# Patient Record
Sex: FEMALE | Race: WHITE
[De-identification: names, ages, dates, MRNs, and addresses within clinical notes are randomized per-mention and may not be internally consistent; named-entity substitution may affect disease eponyms.]

---

## 2018-01-15 ENCOUNTER — HOSPITAL ENCOUNTER (EMERGENCY)
Dept: HOSPITAL 84 - D.ER | Age: 23
Discharge: HOME | End: 2018-01-15
Payer: MEDICAID

## 2018-01-15 VITALS — BODY MASS INDEX: 27.7 KG/M2
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
## 2018-01-15 DIAGNOSIS — S61.511A: Primary | ICD-10-CM
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
## 2018-01-15 DIAGNOSIS — F17.200: ICD-10-CM

## 2018-06-14 ENCOUNTER — HOSPITAL ENCOUNTER (EMERGENCY)
Dept: HOSPITAL 84 - D.ER | Age: 23
Discharge: HOME | End: 2018-06-14
Payer: MEDICAID

## 2018-06-14 VITALS — DIASTOLIC BLOOD PRESSURE: 57 MMHG | SYSTOLIC BLOOD PRESSURE: 109 MMHG

## 2018-06-14 VITALS — BODY MASS INDEX: 20.66 KG/M2 | WEIGHT: 105.22 LBS | HEIGHT: 60 IN
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
## 2018-06-14 DIAGNOSIS — F17.200: ICD-10-CM

## 2018-06-14 DIAGNOSIS — S60.222A: Primary | ICD-10-CM

## 2018-06-14 DIAGNOSIS — S61.213A: ICD-10-CM
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
## 2018-09-13 ENCOUNTER — HOSPITAL ENCOUNTER (EMERGENCY)
Dept: HOSPITAL 84 - D.ER | Age: 23
Discharge: HOME | End: 2018-09-13
Payer: MEDICAID

## 2018-09-13 VITALS — DIASTOLIC BLOOD PRESSURE: 74 MMHG | SYSTOLIC BLOOD PRESSURE: 121 MMHG

## 2018-09-13 VITALS
HEIGHT: 60 IN | BODY MASS INDEX: 19.63 KG/M2 | WEIGHT: 100 LBS | HEIGHT: 60 IN | WEIGHT: 100 LBS | BODY MASS INDEX: 19.63 KG/M2
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
## 2018-09-13 DIAGNOSIS — S42.021A: Primary | ICD-10-CM
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
## 2018-09-13 DIAGNOSIS — F17.200: ICD-10-CM

## 2018-10-08 ENCOUNTER — HOSPITAL ENCOUNTER (EMERGENCY)
Dept: HOSPITAL 84 - D.ER | Age: 23
LOS: 1 days | Discharge: HOME | End: 2018-10-09
Payer: MEDICAID

## 2018-10-08 VITALS — WEIGHT: 110.23 LBS | BODY MASS INDEX: 21.64 KG/M2 | HEIGHT: 60 IN

## 2018-10-08 DIAGNOSIS — S42.001A: Primary | ICD-10-CM
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
## 2018-10-08 DIAGNOSIS — F17.200: ICD-10-CM
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
## 2018-10-09 VITALS — SYSTOLIC BLOOD PRESSURE: 132 MMHG | DIASTOLIC BLOOD PRESSURE: 78 MMHG

## 2021-03-04 ENCOUNTER — HOSPITAL ENCOUNTER (EMERGENCY)
Dept: HOSPITAL 84 - D.ER | Age: 26
Discharge: HOME | End: 2021-03-04
Payer: MEDICAID

## 2021-03-04 VITALS
BODY MASS INDEX: 24 KG/M2 | HEIGHT: 60 IN | HEIGHT: 60 IN | WEIGHT: 122.26 LBS | BODY MASS INDEX: 24 KG/M2 | WEIGHT: 122.26 LBS

## 2021-03-04 VITALS — DIASTOLIC BLOOD PRESSURE: 70 MMHG | SYSTOLIC BLOOD PRESSURE: 112 MMHG

## 2021-03-04 DIAGNOSIS — Z72.0: ICD-10-CM

## 2021-03-04 DIAGNOSIS — J32.9: Primary | ICD-10-CM

## 2021-03-04 DIAGNOSIS — R04.2: ICD-10-CM

## 2021-03-05 ENCOUNTER — HOSPITAL ENCOUNTER (INPATIENT)
Dept: HOSPITAL 84 - D.ER | Age: 26
LOS: 3 days | Discharge: HOME | DRG: 176 | End: 2021-03-08
Attending: FAMILY MEDICINE | Admitting: FAMILY MEDICINE
Payer: MEDICAID

## 2021-03-05 VITALS
WEIGHT: 125.4 LBS | HEIGHT: 60 IN | HEIGHT: 60 IN | WEIGHT: 125.4 LBS | BODY MASS INDEX: 24.62 KG/M2 | BODY MASS INDEX: 24.62 KG/M2 | BODY MASS INDEX: 24.62 KG/M2

## 2021-03-05 VITALS — SYSTOLIC BLOOD PRESSURE: 98 MMHG | DIASTOLIC BLOOD PRESSURE: 45 MMHG

## 2021-03-05 VITALS — DIASTOLIC BLOOD PRESSURE: 54 MMHG | SYSTOLIC BLOOD PRESSURE: 112 MMHG

## 2021-03-05 VITALS — DIASTOLIC BLOOD PRESSURE: 73 MMHG | SYSTOLIC BLOOD PRESSURE: 120 MMHG

## 2021-03-05 VITALS — DIASTOLIC BLOOD PRESSURE: 56 MMHG | SYSTOLIC BLOOD PRESSURE: 98 MMHG

## 2021-03-05 VITALS — SYSTOLIC BLOOD PRESSURE: 111 MMHG | DIASTOLIC BLOOD PRESSURE: 70 MMHG

## 2021-03-05 DIAGNOSIS — F17.200: ICD-10-CM

## 2021-03-05 DIAGNOSIS — I26.99: Primary | ICD-10-CM

## 2021-03-05 DIAGNOSIS — J32.9: ICD-10-CM

## 2021-03-05 DIAGNOSIS — D64.9: ICD-10-CM

## 2021-03-05 DIAGNOSIS — F12.90: ICD-10-CM

## 2021-03-05 DIAGNOSIS — R09.1: ICD-10-CM

## 2021-03-05 DIAGNOSIS — R07.89: ICD-10-CM

## 2021-03-05 LAB
ALBUMIN SERPL-MCNC: 3.7 G/DL (ref 3.4–5)
ALP SERPL-CCNC: 81 U/L (ref 30–120)
ALT SERPL-CCNC: 34 U/L (ref 10–68)
AMPHETAMINES UR QL SCN: NEGATIVE QUAL
ANION GAP SERPL CALC-SCNC: 11.5 MMOL/L (ref 8–16)
BARBITURATES UR QL SCN: NEGATIVE QUAL
BASOPHILS NFR BLD AUTO: 0.1 % (ref 0–2)
BENZODIAZ UR QL SCN: NEGATIVE QUAL
BILIRUB SERPL-MCNC: 0.46 MG/DL (ref 0.2–1.3)
BILIRUB SERPL-MCNC: NEGATIVE MG/DL
BUN SERPL-MCNC: 12 MG/DL (ref 7–18)
BZE UR QL SCN: NEGATIVE QUAL
CALCIUM SERPL-MCNC: 8.9 MG/DL (ref 8.5–10.1)
CANNABINOIDS UR QL SCN: POSITIVE QUAL
CHLORIDE SERPL-SCNC: 100 MMOL/L (ref 98–107)
CK MB SERPL-MCNC: 0.1 U/L (ref 0–3.6)
CK SERPL-CCNC: 353 UL (ref 21–215)
CO2 SERPL-SCNC: 27.1 MMOL/L (ref 21–32)
CREAT SERPL-MCNC: 0.7 MG/DL (ref 0.6–1.3)
EOSINOPHIL NFR BLD: 3.3 % (ref 0–7)
ERYTHROCYTE [DISTWIDTH] IN BLOOD BY AUTOMATED COUNT: 13.1 % (ref 11.5–14.5)
GLOBULIN SER-MCNC: 3.8 G/L
GLUCOSE SERPL-MCNC: 109 MG/DL (ref 74–106)
HCG SERPL-ACNC: NEGATIVE M[IU]/ML
HCT VFR BLD CALC: 41.6 % (ref 36–48)
HGB BLD-MCNC: 14.3 G/DL (ref 12–16)
IMM GRANULOCYTES NFR BLD: 0.2 % (ref 0–5)
KETONES UR STRIP-MCNC: (no result) MG/DL
LYMPHOCYTES # BLD: 2.09 10X3/UL (ref 1.18–3.74)
LYMPHOCYTES NFR BLD AUTO: 18 % (ref 15–50)
MCH RBC QN AUTO: 29.9 PG (ref 26–34)
MCHC RBC AUTO-ENTMCNC: 34.4 G/DL (ref 31–37)
MCV RBC: 87 FL (ref 80–100)
MONOCYTES NFR BLD: 9.9 % (ref 2–11)
NEUTROPHILS # BLD: 7.94 10X3/UL (ref 1.56–6.13)
NEUTROPHILS NFR BLD AUTO: 68.5 % (ref 40–80)
NITRITE UR-MCNC: NEGATIVE MG/ML
OPIATES UR QL SCN: NEGATIVE QUAL
OSMOLALITY SERPL CALC.SUM OF ELEC: 270 MOSM/KG (ref 275–300)
PCP UR QL SCN: NEGATIVE QUAL
PH UR STRIP: 6 [PH] (ref 5–8)
PLATELET # BLD: 207 10X3/UL (ref 130–400)
PMV BLD AUTO: 9.2 FL (ref 7.4–10.4)
POTASSIUM SERPL-SCNC: 3.6 MMOL/L (ref 3.5–5.1)
PROT SERPL-MCNC: 7.5 G/DL (ref 6.4–8.2)
RBC # BLD AUTO: 4.78 10X6/UL (ref 4–5.4)
SODIUM SERPL-SCNC: 135 MMOL/L (ref 136–145)
UROBILINOGEN UR-MCNC: 1 MG/DL (ref ?–2)
WBC # BLD AUTO: 11.6 10X3/UL (ref 4.8–10.8)

## 2021-03-05 NOTE — NUR
PATIENT IS SCREAMING AND CRYING IN WAITING ROOM AND TRIAGE, STAFF ATTEMPTED TO
CALM HER, PATIENT UNCOOPERATIVE. TRIAGE NURSE ATTEMPTING TO ASSESS. CARDIAC AND
RESPIRATORY WNL.

## 2021-03-05 NOTE — EC
PATIENT:SABIHA ERAZO                 DATE OF SERVICE: 03/05/21
SEX: F                                  MEDICAL RECORD: [id number]
DATE OF BIRTH: [birth year]                        LOCATION:[facility]
AGE OF PATIENT: 25                             ADMISSION DATE: 03/05/21
 
REFERRING PHYSICIAN:                               
 
INTERPRETING PHYSICIAN: CODY VEE MD               
 
 
 
                             ECHOCARDIOGRAM REPORT
  ECHO CHARGES 4               ECHO COMPLETE                 Date: 03/07/21
 
 
 
CLINICAL DIAGNOSIS: BILATERAL PE'S, CHEST PAIN    
 
                         ECHOCARDIOGRAPHIC MEASUREMENTS
      (adult normal given)
   AC root (d.<3.7cm) 2.5  cm   LV Septum d (<1.2 cm> 0.8  cm
      Valve Excursion 1.2  cm     LV Septum (systole) 0.9  cm
Left Atria (s.<4.0cm> 2.9  cm          LVPW d(<1.2cm) 0.9  cm
        RV (d.<2.3cm) 2.7  cm           LVPW (sytole) 1.4  cm
  LV diastole(<5.6CM) 4.0  cm       MV E-F(>70mm/sec)      cm
           LV systole 2.7  cm           LVOT Diameter 1.9  cm
       MV exc.(>10mm) 1.6  cm
Est.ejection fraction (50-75%)     %
 
   DOPPLER:
     LVIT      cm/sec A 67.0 cm/sec E 111.0 cm/sec
       LA      cm/sec      RVSP 37   mmHg
     LVOT 116  cm/sec   AOP1/2T      m/s
  Asc. Ao 137  cm/sec
     RVOT 55   cm/sec
       RA      cm/sec
         cm/sec
 AV Gradient Peak 7.51 mmHg  AV Mean 3.73 mmHg  AV Area 2.8  cm
 MV Gradient Peak 6.63 mmHg  MV Mean 2.95 mmHg  MV Area      cm
   COMMENTS:                                              
 
 
 Cardiac Sonographer: 2               CLAUDIO KINNEY              
      Cardiologist: 5          Dr. Vee               
             TAPE# PACS           
                                       Pericardial Effusion N                        
 
 
DATE OF SERVICE:  
 
INTERPRETATION:  Normal left ventricular chamber size and contractile function
with ejection fraction of 60%.  Right atrium and right ventricular chamber size
and function appears normal.  Left atrial chamber appears normal.  Aortic valve
appears normal.  No aortic stenosis/regurgitation noted.  Mitral valve appears
normal.  Trace mitral regurgitation.  Tricuspid valve appears normal.  Trace
tricuspid regurgitation.  Pulmonic valve appears normal.  No pulmonary
insufficiency.  No pericardial effusion visualized.
 
 
 
 
ECHOCARDIOGRAM REPORT                          [id number]    SABIHA ERAZO         
 
 
IMPRESSION:  Normal left ventricular chamber size and contractile function with
an ejection fraction of 60%.
 
TRANSINT:[id number] Voice Confirmation ID: [id number] DOCUMENT ID: [id number]
                                           
                                           CODY VEE MD               
 
 
 
 
 
 
 
 
 
 
 
 
 
 
 
 
 
 
 
 
 
 
 
 
 
 
 
 
 
 
 
 
 
 
 
 
 
 
 
 
CC:                                                             [id number]
DICTATION DATE: 03/07/21 1442     :     03/07/21 1657      ADM IN  
                                                                              
[facility]                                          
[address] [id number]

## 2021-03-05 NOTE — NUR
SUPINE IN BED, A&O X 4. REPORTS CRAMPING TO LEFT SIDE. REPORTS OF BLOODY
SPUTUM. SMALL ABOUNT OF PINK/RED TINGED SPIT IN CUP. WARM PACK PER REQUEST. NO
FURTHER NEEDS VOICED, CTM.

## 2021-03-06 VITALS — SYSTOLIC BLOOD PRESSURE: 100 MMHG | DIASTOLIC BLOOD PRESSURE: 44 MMHG

## 2021-03-06 VITALS — SYSTOLIC BLOOD PRESSURE: 109 MMHG | DIASTOLIC BLOOD PRESSURE: 68 MMHG

## 2021-03-06 VITALS — DIASTOLIC BLOOD PRESSURE: 66 MMHG | SYSTOLIC BLOOD PRESSURE: 111 MMHG

## 2021-03-06 VITALS — DIASTOLIC BLOOD PRESSURE: 50 MMHG | SYSTOLIC BLOOD PRESSURE: 98 MMHG

## 2021-03-06 VITALS — DIASTOLIC BLOOD PRESSURE: 62 MMHG | SYSTOLIC BLOOD PRESSURE: 131 MMHG

## 2021-03-06 VITALS — DIASTOLIC BLOOD PRESSURE: 63 MMHG | SYSTOLIC BLOOD PRESSURE: 106 MMHG

## 2021-03-06 LAB
ANION GAP SERPL CALC-SCNC: 8.5 MMOL/L (ref 8–16)
BASOPHILS NFR BLD AUTO: 0.1 % (ref 0–2)
BUN SERPL-MCNC: 18 MG/DL (ref 7–18)
CALCIUM SERPL-MCNC: 8.5 MG/DL (ref 8.5–10.1)
CHLORIDE SERPL-SCNC: 102 MMOL/L (ref 98–107)
CO2 SERPL-SCNC: 28.5 MMOL/L (ref 21–32)
CREAT SERPL-MCNC: 0.7 MG/DL (ref 0.6–1.3)
EOSINOPHIL NFR BLD: 4.4 % (ref 0–7)
ERYTHROCYTE [DISTWIDTH] IN BLOOD BY AUTOMATED COUNT: 13.4 % (ref 11.5–14.5)
GLUCOSE SERPL-MCNC: 77 MG/DL (ref 74–106)
HCT VFR BLD CALC: 38.4 % (ref 36–48)
HGB BLD-MCNC: 12.8 G/DL (ref 12–16)
IMM GRANULOCYTES NFR BLD: 0.2 % (ref 0–5)
LYMPHOCYTES # BLD: 2.27 10X3/UL (ref 1.18–3.74)
LYMPHOCYTES NFR BLD AUTO: 25.6 % (ref 15–50)
MAGNESIUM SERPL-MCNC: 2.3 MG/DL (ref 1.8–2.4)
MCH RBC QN AUTO: 29.4 PG (ref 26–34)
MCHC RBC AUTO-ENTMCNC: 33.3 G/DL (ref 31–37)
MCV RBC: 88.3 FL (ref 80–100)
MONOCYTES NFR BLD: 11.6 % (ref 2–11)
NEUTROPHILS # BLD: 5.15 10X3/UL (ref 1.56–6.13)
NEUTROPHILS NFR BLD AUTO: 58.1 % (ref 40–80)
OSMOLALITY SERPL CALC.SUM OF ELEC: 270 MOSM/KG (ref 275–300)
PHOSPHATE SERPL-MCNC: 4.1 MG/DL (ref 2.5–4.9)
PLATELET # BLD: 189 10X3/UL (ref 130–400)
PMV BLD AUTO: 9.2 FL (ref 7.4–10.4)
POTASSIUM SERPL-SCNC: 4 MMOL/L (ref 3.5–5.1)
RBC # BLD AUTO: 4.35 10X6/UL (ref 4–5.4)
SODIUM SERPL-SCNC: 135 MMOL/L (ref 136–145)
WBC # BLD AUTO: 8.9 10X3/UL (ref 4.8–10.8)

## 2021-03-06 NOTE — HP
PATIENT: SABIHA ERAZO                                 MEDICAL RECORD: [id number]
ACCOUNT: [id number]                                    LOCATION:[facility]
: [birth year]                                            ADMISSION DATE: 21
                                                         PCP: YASSINE SUN    
 
                             HISTORY AND PHYSICAL EXAMINATION
 
 
DATE OF ADMISSION:  2021
 
CHIEF COMPLAINT:  Pain with breathing "cramping everywhere."
 
HISTORY OF PRESENT ILLNESS:  This is a 25-year-old female whose primary care
provider is a nurse practitioner with [facility].  She came to [place] ER
on 2021 and was diagnosed with sinusitis.  She came back on 2021
complaining of pain with breathing, worse on the left side and "cramping
everywhere," screaming loudly in the Emergency Department.  Urine drug screen
was positive for marijuana.  D-dimer was elevated at 3.19.  CT angiogram of the
chest with PE protocol showed bilateral pulmonary emboli and peripheral
consolidation in the left lower lobe consistent with pulmonary infarct, less
likely pneumonia.  She is admitted.  The patient has no personal history of
blood clots.  No known family history of blood clot.  She is not on estrogen. 
She has not had any major surgery recently.
 
PAST MEDICAL HISTORY:  Significant for depression, anxiety, posttraumatic stress
disorder.
 
PAST SURGICAL HISTORY:  ORIF of right shoulder/clavicle by Dr. Mckinney at [facility] in 2018.  She had Mirena placed, which has no estrogen.
 
HOME MEDICATIONS:  Gabapentin 300 mg t.i.d., meloxicam 7.5 mg once or twice a
day, Atarax 10 mg p.o. every 4 hours p.r.n. anxiety, Prozac 10 mg once a day.
 
ALLERGIES:  No known drug allergies.
 
FAMILY HISTORY:  Reportedly, the hypertension.
 
SOCIAL HISTORY:  Unemployed.  She has a child that is in foster care at this
point.
 
HABITS:  She smokes cigarettes.  She smokes marijuana.  She admits to IV
methamphetamine use in the past.
 
REVIEW OF SYSTEMS:
GENERAL:  No major weight changes.
HEENT:  No particular sinus or allergy problems.
RESPIRATORY:  No history of asthma or pneumonia.
CARDIAC:  No history of heart disease.
GASTROINTESTINAL:  No known diarrhea, constipation, or heartburn.
GENITOURINARY:  No significant problems there.  She does not have periods due to
the Mirena.
MUSCULOSKELETAL:  She has had some chronic pain around her right
clavicle/shoulder area.
NEUROLOGIC:  No migraines or seizures.
PSYCHIATRIC:  Depression/anxiety and possible posttraumatic stress disorder.
 
PHYSICAL EXAMINATION:
VITAL SIGNS:  Temperature 97.7, heart rate 60, respirations 18, blood pressure
 
 
 
HISTORY AND PHYSICAL                           [id number]    SABIHA ERAZO         
 
 
112/54, O2 sat 98%.
GENERAL:  She is lying comfortably in hospital bed room 2223, awake and alert,
in no acute distress.
SKIN:  Warm and dry.
HEENT:  Grossly within normal limits.
NECK:  Supple.  No JVD or bruit.
HEART:  Regular rate and rhythm.
LUNGS:  Clear.  No wheeze, no rales.
ABDOMEN:  Soft, flat, nontender.
EXTREMITIES:  There is no edema anywhere.
 
LABORATORY AND DIAGNOSTIC DATA:  Urinalysis is normal.  Urine drug screen
positive for THC.  CBC with a white count of 11,600, hemoglobin 14.3, hematocrit
41.6.  Sodium 135, potassium 3.6, chloride 100, CO2 27.1, BUN 12, creatinine
0.7, glucose 109, calcium 8.9.  Liver functions are all normal.  D-dimer
elevated at 3.19.  CK elevated at 353.  Serum hemoglobin is negative.  CT
angiogram of the chest with PE protocol shows positive for bilateral pulmonary
emboli and peripheral consolidation in the left lower lobe, likely representing
a pulmonary infarct, less likely pneumonia.
 
ASSESSMENT:
1.  Bilateral pulmonary emboli.
2.  Atypical chest pain.
3.  History of marijuana use.
4.  History of IV methamphetamine use.
 
PLAN:  She is admitted, started on Lovenox.  We will order bilateral venous
Doppler ultrasound of the lower extremities.  We will ask pulmonary to see about
her pulmonary emboli.  Other tests or procedures as warranted.
 
TRANSINT:[id number] Voice Confirmation ID: [id number] DOCUMENT ID: [id number]
 
 
                                           
                                           LALO TRACY MD              
 
 
 
Electronically Signed by LALO TRACY on 21 at 2235
 
 
 
 
 
 
 
 
CC:                                                             [id number]
DICTATION DATE: 21 1304     :     21 1339      ADM IN  
                                                                              
[id number] [place] [address]

## 2021-03-06 NOTE — NUR
PATIENT RESTING IN BED WITH NO S/S OF DISTRESS. PATIENT DENIES NEEDS AT THIS
TIME. BED IN LOWEST POSITION AND CALL LIGHT IN REACH. ENCOURAGED PATIENT TO
CALL WITH NEEDS.

## 2021-03-07 VITALS — DIASTOLIC BLOOD PRESSURE: 75 MMHG | SYSTOLIC BLOOD PRESSURE: 108 MMHG

## 2021-03-07 VITALS — SYSTOLIC BLOOD PRESSURE: 108 MMHG | DIASTOLIC BLOOD PRESSURE: 52 MMHG

## 2021-03-07 VITALS — DIASTOLIC BLOOD PRESSURE: 50 MMHG | SYSTOLIC BLOOD PRESSURE: 108 MMHG

## 2021-03-07 VITALS — DIASTOLIC BLOOD PRESSURE: 63 MMHG | SYSTOLIC BLOOD PRESSURE: 122 MMHG

## 2021-03-07 NOTE — NUR
PATIENT RESTING IN BED WITH NO S/S OF DISTRESS. PATIENT REQUESTED TORADOL WHEN
AVAILABLE AND DENIES OTHER NEEDS AT THIS TIME. BED IN LOWEST POSITION AND CALL
LIGHT IN REACH. ENCOURAGED PATIENT TO CALL WITH NEEDS.

## 2021-03-08 VITALS — SYSTOLIC BLOOD PRESSURE: 110 MMHG | DIASTOLIC BLOOD PRESSURE: 51 MMHG

## 2021-03-08 NOTE — MORECARE
CASE MANAGEMENT DISCHARGE SUMMARY
 
 
PATIENT: SABIHA ERAZO                     UNIT: [id number]
ACCOUNT#: [id number]                       ADM DATE: 21
AGE: 25     : [birth year]  SEX: F            ROOM/BED: D.2223    
AUTHOR: RUPERTO SHAW                             PHYSICIAN:                               
 
REFERRING PHYSICIAN: LALO TRACY MD              
DATE OF SERVICE: 21
Discharge Plan
 
 
Patient Name: SABIHA ERAZO
Facility: [facility]:[place]
Encounter #: [id number]
Medical Record #: [id number]
: 1995
Planned Disposition: 
Anticipated Discharge Date: 
 
Discharge Date: 
Expected LOS: 
Initial Reviewer: [email or web address]
Initial Review Date: 2021
Generated: 3/8/21  12:37 pm 
Comments
 
DCP- Discharge Planning
 
Updated by [email or web address]: Anisa Campos on 3/8/21  10:31 am CT
Patient Name: SABIHA ERAZO                                     
Admission Status: ER   
Accout number: [id number]                              
Admission Date: 2021   
: 1995                                                        
Admission Diagnosis:   
Attending: LALO TRACY                                                
Current LOS:  3   
  
Anticipated DC Date:    
Planned Disposition:    
Primary Insurance: MEDICAID [place]   
  
  
Discharge Planning Comments: PATIENT GIVEN 30 DAY FREE CARD FOR XARELTO. THIS 
SHOULD GET HER THROUGH UNTIL HER FOLLOW UP WITH HER PCP. CM TO FOLLOW AND 
ASSIST AS NEEDED.   
  
  
 
  
  
  
  
: Anisa Campos
 DCPIA - Discharge Planning Initial Assessment
 
Updated by [email or web address]: Anisa Campos on 3/8/21  11:34 am
*  Is the patient Alert and Oriented?
Yes
*  PCP
[facility]
*  Pharmacy
[facility]
*  Preadmission Environment
Home with Family
*  ADLs
Independent
*  Equipment
None
*  Community resources currently utilized
None
*  Additional services required to return to the preadmission environment?
No
*  Can the patient safely return to the preadmission environment?
Yes
*  Has this patient been hospitalized within the prior 30 days at any 
hospital?
No
 
 
 
 
 
 
Patient Name: SABIHA ERAZO
 
Encounter #: [id number]
Page [id number]
 
 
 
 
 
Electronically Signed by RUPERTO SHAW on 21 at 1138
 
 
 
 
 
 
**All edits/amendments must be made on the electronic document**
 
DICTATION DATE: 21 1137     : KIERRA  21 1137     
RPT#: [id number]                                DC DATE:        
                                               STATUS: ADM IN  
[id number] [place] [address]
***END OF REPORT***

## 2021-03-09 LAB
CARDIOLIPIN IGG SER IA-ACNC: <9 GPL U/ML (ref 0–14)
CARDIOLIPIN IGM SER IA-ACNC: 16 MPL U/ML (ref 0–12)
HAPTOGLOB SERPL-MCNC: 159 MG/DL (ref 33–278)

## 2022-05-04 NOTE — MORECARE
CASE MANAGEMENT DISCHARGE SUMMARY
 
 
PATIENT: SABIHA ERAZO                     UNIT: [id number]
ACCOUNT#: [id number]                       ADM DATE: 21
AGE: 25     : [birth year]  SEX: F            ROOM/BED: D.2223    
AUTHOR: RUPERTO SHAW                             PHYSICIAN:                               
 
REFERRING PHYSICIAN: LALO TRACY MD              
DATE OF SERVICE: 21
Discharge Plan
 
 
Patient Name: SABIHA ERAZO
Facility: [facility]:[place]
Encounter #: [id number]
Medical Record #: [id number]
: 1995
Planned Disposition: 
Anticipated Discharge Date: 
 
Discharge Date: 2021
Expected LOS: 
Initial Reviewer: [email or web address]
Initial Review Date: 2021
Generated: 3/9/21  11:24 am 
Comments
 
DCP- Discharge Planning
 
Updated by [email or web address]: Anisa Campos on 3/8/21  10:31 am CT
Patient Name: SABIHA ERAZO                                     
Admission Status: ER   
Accout number: [id number]                              
Admission Date: 2021   
: 1995                                                        
Admission Diagnosis:   
Attending: LALO TRACY                                                
Current LOS:  3   
  
Anticipated DC Date:    
Planned Disposition:    
Primary Insurance: [place]   
  
  
Discharge Planning Comments: PATIENT GIVEN 30 DAY FREE CARD FOR XARELTO. THIS 
SHOULD GET HER THROUGH UNTIL HER FOLLOW UP WITH HER PCP. CM TO FOLLOW AND 
ASSIST AS NEEDED.   
  
  
 
  
  
  
  
: Anisa Campos
 DCPIA - Discharge Planning Initial Assessment
 
Updated by [email or web address]: Anisa Campos on 3/8/21  11:34 am
*  Is the patient Alert and Oriented?
Yes
*  PCP
[facility]IN[facility]
*  Pharmacy
[facility]
*  Preadmission Environment
Home with Family
*  ADLs
Independent
*  Equipment
None
*  Community resources currently utilized
None
*  Additional services required to return to the preadmission environment?
No
*  Can the patient safely return to the preadmission environment?
Yes
*  Has this patient been hospitalized within the prior 30 days at any 
hospital?
No
 
 
 
 
 
 
 
Last DP export: 3/8/21  10:38 am
Patient Name: SABIHA ERAZO
 
Encounter #: [id number]
Page [id number]
 
 
 
 
 
Electronically Signed by RUPERTO SHAW on 21 at 1025
 
 
 
 
 
 
**All edits/amendments must be made on the electronic document**
 
DICTATION DATE: 21 1024     : KIERRA  21 1024     
RPT#: [id number]                                DC DATE:21
                                               STATUS: DIS IN  
[facility]
[address] [place] [address]
***END OF REPORT*** Bilobed Transposition Flap Text: The defect edges were debeveled with a #15 scalpel blade.  Given the location of the defect and the proximity to free margins a bilobed transposition flap was deemed most appropriate.  Using a sterile surgical marker, an appropriate bilobe flap drawn around the defect.    The area thus outlined was incised deep to adipose tissue with a #15 scalpel blade.  The skin margins were undermined to an appropriate distance in all directions utilizing iris scissors.